# Patient Record
Sex: MALE | Race: ASIAN | Employment: FULL TIME | URBAN - METROPOLITAN AREA
[De-identification: names, ages, dates, MRNs, and addresses within clinical notes are randomized per-mention and may not be internally consistent; named-entity substitution may affect disease eponyms.]

---

## 2019-11-27 ENCOUNTER — OFFICE VISIT (OUTPATIENT)
Dept: FAMILY MEDICINE | Facility: CLINIC | Age: 59
End: 2019-11-27
Payer: COMMERCIAL

## 2019-11-27 ENCOUNTER — HOSPITAL ENCOUNTER (OUTPATIENT)
Dept: GENERAL RADIOLOGY | Facility: CLINIC | Age: 59
Discharge: HOME | End: 2019-11-27
Attending: NURSE PRACTITIONER | Admitting: NURSE PRACTITIONER
Payer: COMMERCIAL

## 2019-11-27 VITALS
BODY MASS INDEX: 26.46 KG/M2 | TEMPERATURE: 97.6 F | SYSTOLIC BLOOD PRESSURE: 112 MMHG | HEIGHT: 68 IN | DIASTOLIC BLOOD PRESSURE: 60 MMHG | WEIGHT: 174.6 LBS | RESPIRATION RATE: 16 BRPM | HEART RATE: 89 BPM | OXYGEN SATURATION: 94 %

## 2019-11-27 DIAGNOSIS — M54.50 ACUTE MIDLINE LOW BACK PAIN WITHOUT SCIATICA: ICD-10-CM

## 2019-11-27 DIAGNOSIS — M54.50 ACUTE MIDLINE LOW BACK PAIN WITHOUT SCIATICA: Primary | ICD-10-CM

## 2019-11-27 DIAGNOSIS — Z12.11 SPECIAL SCREENING FOR MALIGNANT NEOPLASMS, COLON: ICD-10-CM

## 2019-11-27 PROCEDURE — 72100 X-RAY EXAM L-S SPINE 2/3 VWS: CPT

## 2019-11-27 PROCEDURE — 99203 OFFICE O/P NEW LOW 30 MIN: CPT | Performed by: NURSE PRACTITIONER

## 2019-11-27 RX ORDER — SIMVASTATIN 40 MG
40 TABLET ORAL DAILY
Refills: 3 | COMMUNITY
Start: 2019-09-25

## 2019-11-27 RX ORDER — METHOCARBAMOL 500 MG/1
500-1000 TABLET, FILM COATED ORAL 4 TIMES DAILY PRN
Qty: 40 TABLET | Refills: 1 | Status: SHIPPED | OUTPATIENT
Start: 2019-11-27

## 2019-11-27 RX ORDER — GLIMEPIRIDE 2 MG/1
2 TABLET ORAL DAILY
Refills: 3 | COMMUNITY
Start: 2019-09-25

## 2019-11-27 ASSESSMENT — MIFFLIN-ST. JEOR: SCORE: 1576.48

## 2019-11-27 NOTE — PROGRESS NOTES
Subjective     Marlys Montanez is a 58 year old male who presents to clinic today for the following health issues:    HPI   Lower Back Pain      Duration: 10 days ago - was doing yardwork and pain was minor.  Pain has gotten steadily worse.  Since yesterday afternoon he was unable to get up and not be mobile.         Specific cause: lifting, bending    Description:   Location of pain: low back all the way around  Character of pain: cramping  Pain radiation:none and goes up back  New numbness or weakness in legs, not attributed to pain:  YES- weakness    Intensity: Currently 2/10    History:   Pain interferes with job: YES  History of back problems: no prior back problems, but has had cramping in back from bending.  At beginning of this year he had a spastic back episode.   Any previous MRI or X-rays: None  Sees a specialist for back pain:  No  Therapies tried without relief: ibuprofen, bengay helps a little when in resting     Alleviating factors:   Improved by: rest      Precipitating factors:  Worsened by: Lifting, Bending, Standing and Walking        Accompanying Signs & Symptoms:  Risk of Fracture:  None  Risk of Cauda Equina:  None  Risk of Infection:  None  Risk of Cancer:  None  Risk of Ankylosing Spondylitis:  Onset at age <35, male, AND morning back stiffness. no     Works in computer programming.  Active outside of work with sports.    Son is with him today - is pharmacy resident  Patient has diabetes type two - PCP is at [facility] and that clinic did not have an ortho specialist so they came here today (?).  Patient is not sure when his next appt is, but is due soon.  Has declined colon cancer screening - not wanting to do colonoscopy  Former smoker.  Rare alcohol use    Reviewed and updated as needed this visit by Provider         Review of Systems   ROS COMP: Constitutional, HEENT, cardiovascular, pulmonary, gi and gu systems are negative, except as otherwise noted.      Objective    /60 (BP  "Location: Right arm, Patient Position: Sitting, Cuff Size: Adult Regular)   Pulse 89   Temp 97.6  F (36.4  C) (Oral)   Resp 16   Ht 1.727 m (5' 8\")   Wt 79.2 kg (174 lb 9.6 oz)   SpO2 94%   BMI 26.55 kg/m    Body mass index is 26.55 kg/m .  Physical Exam   GENERAL: healthy, alert and no distress  EYES: Eyes grossly normal to inspection, PERRL and conjunctivae and sclerae normal  HENT: ear canals and TM's normal, nose and mouth without ulcers or lesions  NECK: no adenopathy, no asymmetry, masses, or scars and thyroid normal to palpation  RESP: lungs clear to auscultation - no rales, rhonchi or wheezes  CV: regular rate and rhythm, normal S1 S2, no S3 or S4, no murmur, click or rub, no peripheral edema and peripheral pulses strong  ABDOMEN: soft, nontender, no hepatosplenomegaly, no masses and bowel sounds normal  MS: no gross musculoskeletal defects noted, no edema  SKIN: no suspicious lesions or rashes  NEURO: Normal strength and tone, mentation intact and speech normal  Comprehensive back pain exam:  Tenderness of paralumbar musculature, Pain limits the following motions: forward flexion, Lower extremity strength functional and equal on both sides, Lower extremity reflexes within normal limits bilaterally, Lower extremity sensation normal and equal on both sides and Straight leg raise negative bilaterally  PSYCH: mentation appears normal, affect normal/bright    Diagnostic Test Results:  Labs reviewed in Epic  Xray - moderate disc space narrowing, no compression fx        Assessment & Plan     (M54.5) Acute midline low back pain without sciatica  (primary encounter diagnosis)  Comment:   Plan: methocarbamol (ROBAXIN) 500 MG tablet, XR         Lumbar Spine 2/3 Views    Xray due to age and new problem for patient.  Xray does not show compression fracture.  Does have moderate OA, however symptoms and history most c/w muscle strain and spasm.  Discussed exam findings and conservative measures at length.  Patient " "would like to try muscle relaxant and physical therapy.  He will get physical therapy arranged in  system.    (Z12.11) Special screening for malignant neoplasms, colon  Comment:   Plan: Strongly advise     BMI:   Estimated body mass index is 26.55 kg/m  as calculated from the following:    Height as of this encounter: 1.727 m (5' 8\").    Weight as of this encounter: 79.2 kg (174 lb 9.6 oz).           Patient Instructions   Ice 15-20 minutes at a time as many times a day as possible - lay flat on the ice with legs at 90 degree angle up on a chair or couch  Ibuprofen 600 mg every 6 hours around clock for the next 3-4 days - take with food  Try muscle relaxant  Stretches - knees to chest one at a time, cobra pose - very small movements and relax back, \"figure 4\"  Ergonomics - squat, kneel when bending, or kick our other leg  No heavy lifting  Walk frequently - this is key!  Pillow under legs when laying flat and between knees when laying on your side  Physical therapy     I strongly advise that you get colon cancer screening  Please discuss starting an \"SGLT-2\" diabetes medication with your primary          Return in about 2 weeks (around 12/11/2019) for back pain.    ZENAIDA Mcdonald [facility]        "

## 2019-11-27 NOTE — PATIENT INSTRUCTIONS
"Ice 15-20 minutes at a time as many times a day as possible - lay flat on the ice with legs at 90 degree angle up on a chair or couch  Ibuprofen 600 mg every 6 hours around clock for the next 3-4 days - take with food  Try muscle relaxant  Stretches - knees to chest one at a time, cobra pose - very small movements and relax back, \"figure 4\"  Ergonomics - squat, kneel when bending, or kick our other leg  No heavy lifting  Walk frequently - this is key!  Pillow under legs when laying flat and between knees when laying on your side  Physical therapy     I strongly advise that you get colon cancer screening  Please discuss starting an \"SGLT-2\" diabetes medication with your primary      "

## 2019-11-29 ENCOUNTER — TELEPHONE (OUTPATIENT)
Dept: FAMILY MEDICINE | Facility: CLINIC | Age: 59
End: 2019-11-29

## 2019-11-29 NOTE — TELEPHONE ENCOUNTER
Please call patient with xray result.  It shows moderate disc space narrowing which is consistent with some arthritis, but unlikely causing his pain.  There was no compression fracture.  Please ask about current symptoms.  LISBETH Albarado

## 2019-11-29 NOTE — TELEPHONE ENCOUNTER
Called and left [facility] for return call to clinic. Arcelia Harrison RN on 11/29/2019 at 4:35 PM

## 2019-12-03 NOTE — TELEPHONE ENCOUNTER
Called patient with Thai . NO answer, Left VMM with  with providers message. Arcelia Harrison RN on 12/3/2019 at 2:43 PM

## 2020-03-11 ENCOUNTER — HEALTH MAINTENANCE LETTER (OUTPATIENT)
Age: 60
End: 2020-03-11

## 2021-01-04 ENCOUNTER — HEALTH MAINTENANCE LETTER (OUTPATIENT)
Age: 61
End: 2021-01-04

## 2021-04-25 ENCOUNTER — HEALTH MAINTENANCE LETTER (OUTPATIENT)
Age: 61
End: 2021-04-25

## 2021-10-10 ENCOUNTER — HEALTH MAINTENANCE LETTER (OUTPATIENT)
Age: 61
End: 2021-10-10

## 2022-05-22 ENCOUNTER — HEALTH MAINTENANCE LETTER (OUTPATIENT)
Age: 62
End: 2022-05-22

## 2022-09-18 ENCOUNTER — HEALTH MAINTENANCE LETTER (OUTPATIENT)
Age: 62
End: 2022-09-18

## 2023-06-04 ENCOUNTER — HEALTH MAINTENANCE LETTER (OUTPATIENT)
Age: 63
End: 2023-06-04

## 2024-02-25 ENCOUNTER — HEALTH MAINTENANCE LETTER (OUTPATIENT)
Age: 64
End: 2024-02-25